# Patient Record
Sex: FEMALE | URBAN - METROPOLITAN AREA
[De-identification: names, ages, dates, MRNs, and addresses within clinical notes are randomized per-mention and may not be internally consistent; named-entity substitution may affect disease eponyms.]

---

## 2021-10-23 VITALS — BODY MASS INDEX: 24.8 KG/M2 | HEIGHT: 67 IN | WEIGHT: 158 LBS

## 2021-10-23 PROBLEM — M75.02 ADHESIVE BURSITIS OF LEFT SHOULDER: Status: ACTIVE | Noted: 2021-10-23

## 2021-10-23 PROBLEM — M25.512 LEFT SHOULDER PAIN: Status: ACTIVE | Noted: 2021-10-23

## 2021-10-23 PROBLEM — Z09 S/P ORTHOPEDIC SURGERY, FOLLOW-UP EXAM: Status: ACTIVE | Noted: 2021-10-23

## 2021-11-15 ENCOUNTER — OFFICE VISIT (OUTPATIENT)
Dept: ORTHOPEDIC SURGERY | Age: 66
End: 2021-11-15
Payer: COMMERCIAL

## 2021-11-15 DIAGNOSIS — Z87.81 S/P RIGHT HIP FRACTURE: Primary | ICD-10-CM

## 2021-11-15 PROCEDURE — 99204 OFFICE O/P NEW MOD 45 MIN: CPT | Performed by: ORTHOPAEDIC SURGERY

## 2021-11-15 RX ORDER — RIVAROXABAN 20 MG/1
TABLET, FILM COATED ORAL
COMMUNITY
Start: 2021-11-02

## 2021-11-15 NOTE — PROGRESS NOTES
Pilar Ortega (: 1955) is a 77 y.o. female, patient, here for evaluation of the following chief complaint(s):  Hip Pain (right hip pain)       HPI:    Patient returns to the office now status post fracture of her right hip. Patient is doing well. She still does note some level of discomfort. States the pain seems to be mostly located across the outer aspect of the hip. She has no groin pain. She uses a cane only for balance purposes    No Known Allergies    Current Outpatient Medications   Medication Sig    Xarelto 20 mg tab tablet      No current facility-administered medications for this visit. Past Medical History:   Diagnosis Date    Adhesive bursitis of left shoulder 10/23/2021    Left shoulder pain 10/23/2021    S/P orthopedic surgery, follow-up exam 10/23/2021    Stroke [facility])         Past Surgical History:   Procedure Laterality Date    HX ORTHOPAEDIC         Family History   Problem Relation Age of Onset    Stroke Mother     Cancer Mother     Cancer Father         Social History     Socioeconomic History    Marital status: UNKNOWN     Spouse name: Not on file    Number of children: Not on file    Years of education: Not on file    Highest education level: Not on file   Occupational History    Not on file   Tobacco Use    Smoking status: Never Smoker    Smokeless tobacco: Never Used   Substance and Sexual Activity    Alcohol use: Yes     Alcohol/week: 1.0 standard drink     Types: 1 Glasses of wine per week    Drug use: Never    Sexual activity: Not on file   Other Topics Concern    Not on file   Social History Narrative    Not on file     Social Determinants of Health     Financial Resource Strain:     Difficulty of Paying Living Expenses: Not on file   Food Insecurity:     Worried About Running Out of Food in the Last Year: Not on file    [place] of Food in the Last Year: Not on file   Transportation Needs:     Lack of Transportation (Medical):  Not on file    Lack of Transportation (Non-Medical): Not on file   Physical Activity:     Days of Exercise per Week: Not on file    Minutes of Exercise per Session: Not on file   Stress:     Feeling of Stress : Not on file   Social Connections:     Frequency of Communication with Friends and Family: Not on file    Frequency of Social Gatherings with Friends and Family: Not on file    Attends Temple Services: Not on file    Active Member of [address] [place] [address] or Organizations: Not on file    Attends Club or Organization Meetings: Not on file    Marital Status: Not on file   Intimate Partner Violence:     Fear of Current or Ex-Partner: Not on file    Emotionally Abused: Not on file    Physically Abused: Not on file    Sexually Abused: Not on file   Housing Stability:     Unable to Pay for Housing in the Last Year: Not on file    Number of [place] in the Last Year: Not on file    Unstable Housing in the Last Year: Not on file       Review of Systems   Constitutional: Negative. HENT: Negative. Eyes: Negative. Respiratory: Negative. Cardiovascular: Negative. Gastrointestinal: Negative. Endocrine: Negative. Genitourinary: Negative. Musculoskeletal: Negative. Hip fracture follow up   Skin: Negative. Allergic/Immunologic: Negative. Neurological: Negative. Hematological: Negative. Psychiatric/Behavioral: Negative. All other systems reviewed and are negative. Vitals: There were no vitals taken for this visit. There is no height or weight on file to calculate BMI. Ortho Exam     Incisions have healed over the right hip. She has no pain with internal or external rotation of her hip. There is no crepitation. She has no swelling noted distally. Calf is soft and supple. Neurovascular examination is intact. ASSESSMENT/PLAN:    Patient is doing quite well. We will continue to advance with physical therapy to work on her range of motion and strength.   Patient is to return to the office in 4 weeks.         Laure Hernandez MD

## 2021-12-13 ENCOUNTER — OFFICE VISIT (OUTPATIENT)
Dept: ORTHOPEDIC SURGERY | Age: 66
End: 2021-12-13
Payer: COMMERCIAL

## 2021-12-13 DIAGNOSIS — Z87.81 S/P RIGHT HIP FRACTURE: Primary | ICD-10-CM

## 2021-12-13 DIAGNOSIS — Z09 STATUS POST ORTHOPEDIC SURGERY, FOLLOW-UP EXAM: ICD-10-CM

## 2021-12-13 PROCEDURE — 99024 POSTOP FOLLOW-UP VISIT: CPT | Performed by: ORTHOPAEDIC SURGERY

## 2021-12-13 NOTE — PROGRESS NOTES
Jake Issa (: 1955) is a 77 y.o. female, patient, here for evaluation of the following chief complaint(s):  Hip Pain (right hip pain)       HPI:    Patient returns to the office now status post nailing of her right hip. She is doing quite well. Her pain is controlled. She is progressing through physical therapy. She believes she is about ready to advance out of therapy    No Known Allergies    Current Outpatient Medications   Medication Sig    Xarelto 20 mg tab tablet      No current facility-administered medications for this visit. Past Medical History:   Diagnosis Date    Adhesive bursitis of left shoulder 10/23/2021    Left shoulder pain 10/23/2021    S/P orthopedic surgery, follow-up exam 10/23/2021    [facility])         Past Surgical History:   Procedure Laterality Date    HX ORTHOPAEDIC         Family History   Problem Relation Age of Onset    Stroke Mother     Cancer Mother     Cancer Father         Social History     Socioeconomic History    Marital status: UNKNOWN     Spouse name: Not on file    Number of children: Not on file    Years of education: Not on file    Highest education level: Not on file   Occupational History    Not on file   Tobacco Use    Smoking status: Never Smoker    Smokeless tobacco: Never Used   Substance and Sexual Activity    Alcohol use: Yes     Alcohol/week: 1.0 standard drink     Types: 1 Glasses of wine per week    Drug use: Never    Sexual activity: Not on file   Other Topics Concern    Not on file   Social History Narrative    Not on file     Social Determinants of Health     Financial Resource Strain:     Difficulty of Paying Living Expenses: Not on file   Food Insecurity:     Worried About Running Out of Food in the Last Year: Not on file    [place] of Food in the Last Year: Not on file   Transportation Needs:     Lack of Transportation (Medical): Not on file    Lack of Transportation (Non-Medical):  Not on file   Physical Activity:     Days of Exercise per Week: Not on file    Minutes of Exercise per Session: Not on file   Stress:     Feeling of Stress : Not on file   Social Connections:     Frequency of Communication with Friends and Family: Not on file    Frequency of Social Gatherings with Friends and Family: Not on file    Attends [employer] Services: Not on file    Active Member of [address] [place] [address] or Organizations: Not on file    Attends Club or Organization Meetings: Not on file    Marital Status: Not on file   Intimate Partner Violence:     Fear of Current or Ex-Partner: Not on file    Emotionally Abused: Not on file    Physically Abused: Not on file    Sexually Abused: Not on file   Housing Stability:     Unable to Pay for Housing in the Last Year: Not on file    Number of [place] in the Last Year: Not on file    Unstable Housing in the Last Year: Not on file       Review of Systems   Musculoskeletal:        Right hip pain       Vitals: There were no vitals taken for this visit. There is no height or weight on file to calculate BMI. Ortho Exam     Right hip: Patient has full range of motion of her hip. Patient has minimal to no pain or crepitation with manipulation of the hip. She still ambulates with a mild antalgic gait but it has significantly improved. She has good strength noted distally. Neurovascular examination is intact. ASSESSMENT/PLAN:    Patient is doing quite well. I think it is reasonable and appropriate to advance out of the physical therapy at this stage. The patient would like to return back to full duty work on 12/27/2021. I think it is reasonable.   She is to return to the office as needed        Jordin Chapa MD

## 2022-03-18 PROBLEM — M25.512 LEFT SHOULDER PAIN: Status: ACTIVE | Noted: 2021-10-23

## 2022-03-19 PROBLEM — Z09 S/P ORTHOPEDIC SURGERY, FOLLOW-UP EXAM: Status: ACTIVE | Noted: 2021-10-23

## 2022-03-19 PROBLEM — M75.02 ADHESIVE CAPSULITIS OF LEFT SHOULDER: Status: ACTIVE | Noted: 2021-10-23
